# Patient Record
Sex: MALE | Race: BLACK OR AFRICAN AMERICAN | Employment: FULL TIME | URBAN - METROPOLITAN AREA
[De-identification: names, ages, dates, MRNs, and addresses within clinical notes are randomized per-mention and may not be internally consistent; named-entity substitution may affect disease eponyms.]

---

## 2019-04-24 ENCOUNTER — HOSPITAL ENCOUNTER (EMERGENCY)
Age: 29
Discharge: HOME | End: 2019-04-24
Attending: EMERGENCY MEDICINE

## 2019-04-24 VITALS
WEIGHT: 232 LBS | TEMPERATURE: 99.4 F | SYSTOLIC BLOOD PRESSURE: 150 MMHG | HEIGHT: 67 IN | OXYGEN SATURATION: 98 % | BODY MASS INDEX: 36.41 KG/M2 | RESPIRATION RATE: 18 BRPM | DIASTOLIC BLOOD PRESSURE: 99 MMHG | HEART RATE: 110 BPM

## 2019-04-24 DIAGNOSIS — J30.9 ALLERGIC RHINITIS, UNSPECIFIED SEASONALITY, UNSPECIFIED TRIGGER: Primary | ICD-10-CM

## 2019-04-24 PROCEDURE — 99282 EMERGENCY DEPT VISIT SF MDM: CPT

## 2019-04-24 RX ORDER — CETIRIZINE HYDROCHLORIDE 10 MG/1
10 TABLET ORAL DAILY
Qty: 30 TABLET | Refills: 0 | Status: SHIPPED | OUTPATIENT
Start: 2019-04-24 | End: 2019-05-24

## 2019-04-24 RX ORDER — FLUTICASONE PROPIONATE 50 MCG
1 SPRAY, SUSPENSION (ML) NASAL DAILY
Qty: 1 BOTTLE | Refills: 0 | Status: SHIPPED | OUTPATIENT
Start: 2019-04-24

## 2019-04-24 SDOH — HEALTH STABILITY: MENTAL HEALTH: HOW OFTEN DO YOU HAVE A DRINK CONTAINING ALCOHOL?: NEVER

## 2019-04-25 NOTE — ED PROVIDER NOTES
file     Attends [employer]oration[employer] service: Not on file     Active member of club or organization: Not on file     Attends meetings of clubs or organizations: Not on file     Relationship status: Not on file    Intimate partner violence:     Fear of current or ex partner: Not on file     Emotionally abused: Not on file     Physically abused: Not on file     Forced sexual activity: Not on file   Other Topics Concern    Not on file   Social History Narrative    Not on file       Nursing notes reviewed. ED Triage Vitals [04/24/19 2004]   Enc Vitals Group      BP (!) 150/99      Pulse 110      Resp 18      Temp 99.4 °F (37.4 °C)      Temp Source Oral      SpO2 98 %      Weight 232 lb (105.2 kg)      Height 5' 7\" (1.702 m)      Head Circumference       Peak Flow       Pain Score       Pain Loc       Pain Edu? Excl. in [address]? GENERAL:  Awake, alert. Well developed, well nourished with no apparent distress. HENT:  Normocephalic, Atraumatic, moist mucous membranes. Nasal mucosa injected bilaterally and slightly edematous with exudates. Posterior oropharynx slightly erythematous with no edema or exudate. EYES:  Pupils equal round and reactive to light, Conjunctiva normal, extraocular movements normal.  NECK:  No meningeal signs, Supple. CHEST:  Regular rate and rhythm, chest wall non-tender. LUNGS:  Clear to auscultation bilaterally, no respiratory distress. SKIN: Warm, dry and intact. NEUROLOGIC: Normal mental status. Moving all extremities to command. MEDICAL DECISION MAKING     No results found for this visit on 04/24/19. I estimate there is LOW risk for EPIGLOTTITIS, PNEUMONIA, MENINGITIS, OR URINARY TRACT INFECTION, thus I consider the discharge disposition reasonable. Also, there is no evidence or peritonitis, sepsis, or toxicity. Alexx Lin and I have discussed the diagnosis and risks, and we agree with discharging home to follow-up with their primary doctor.  We also discussed returning to the Emergency Department immediately if new or worsening symptoms occur. We have discussed the symptoms which are most concerning (e.g., changing or worsening pain, trouble swallowing or breating, neck stiffness, fever) that necessitate immediate return. Final Impression    1. Allergic rhinitis, unspecified seasonality, unspecified trigger        Discharge Vital Signs:  Blood pressure (!) 150/99, pulse 110, temperature 99.4 °F (37.4 °C), temperature source Oral, resp. rate 18, height 5' 7\" (1.702 m), weight 105.2 kg (232 lb), SpO2 98 %. Patient was given scripts for the following medications. I counseled patient how to take these medications. New Prescriptions    CETIRIZINE (ZYRTEC) 10 MG TABLET    Take 1 tablet by mouth daily    FLUTICASONE (FLONASE) 50 MCG/ACT NASAL SPRAY    1 spray by Each Nare route daily 1 Spray in each nostril       Disposition  Pt is in good condition upon Discharge to home. This chart was generated using the [employer] dictation system. I created this record but it may contain dictation errors.           Nivia Ruff MD  04/24/19 2015

## 2022-09-16 ENCOUNTER — HOSPITAL ENCOUNTER (EMERGENCY)
Age: 32
Discharge: HOME | End: 2022-09-16
Attending: EMERGENCY MEDICINE
Payer: COMMERCIAL

## 2022-09-16 VITALS
HEIGHT: 70 IN | BODY MASS INDEX: 30.23 KG/M2 | SYSTOLIC BLOOD PRESSURE: 145 MMHG | WEIGHT: 211.13 LBS | RESPIRATION RATE: 14 BRPM | DIASTOLIC BLOOD PRESSURE: 89 MMHG | HEART RATE: 92 BPM | OXYGEN SATURATION: 99 % | TEMPERATURE: 98.2 F

## 2022-09-16 DIAGNOSIS — K08.89 PAIN, DENTAL: ICD-10-CM

## 2022-09-16 DIAGNOSIS — K02.9 DENTAL CARIES: Primary | ICD-10-CM

## 2022-09-16 PROCEDURE — 99283 EMERGENCY DEPT VISIT LOW MDM: CPT

## 2022-09-16 RX ORDER — IBUPROFEN 600 MG/1
600 TABLET ORAL ONCE
Status: DISCONTINUED | OUTPATIENT
Start: 2022-09-16 | End: 2022-09-16

## 2022-09-16 RX ORDER — ACETAMINOPHEN 500 MG
1000 TABLET ORAL 4 TIMES DAILY PRN
Qty: 30 TABLET | Refills: 0 | Status: SHIPPED | OUTPATIENT
Start: 2022-09-16 | End: 2022-09-21

## 2022-09-16 RX ORDER — IBUPROFEN 600 MG/1
600 TABLET ORAL EVERY 8 HOURS PRN
Qty: 20 TABLET | Refills: 0 | Status: SHIPPED | OUTPATIENT
Start: 2022-09-16 | End: 2022-09-16

## 2022-09-16 RX ORDER — CLINDAMYCIN HYDROCHLORIDE 300 MG/1
300 CAPSULE ORAL 3 TIMES DAILY
Qty: 30 CAPSULE | Refills: 0 | Status: SHIPPED | OUTPATIENT
Start: 2022-09-16 | End: 2022-09-26

## 2022-09-16 ASSESSMENT — PAIN SCALES - GENERAL: PAINLEVEL_OUTOF10: 7

## 2022-09-16 ASSESSMENT — PAIN DESCRIPTION - DESCRIPTORS: DESCRIPTORS: THROBBING

## 2022-09-16 ASSESSMENT — PAIN DESCRIPTION - LOCATION: LOCATION: TEETH

## 2022-09-16 ASSESSMENT — PAIN DESCRIPTION - ORIENTATION: ORIENTATION: RIGHT

## 2022-09-16 ASSESSMENT — PAIN DESCRIPTION - PAIN TYPE: TYPE: ACUTE PAIN

## 2022-09-16 NOTE — DISCHARGE INSTRUCTIONS
Avoid hot and cold foods. Try to chew on the other side of your mouth. Take the clindamycin antibiotic 3 times daily until gone. Take Tylenol 2 tablets every 6 hours for pain. Follow-up with the  oral surgery clinic or one of the other dental clinics listed below. Call first thing Monday morning. DENTAL / [facility]  [phone]    --------------------------------------------------------------------------------------------------------------------      [facility] (3 locations) :  [address]  [address] [address]  [address], [address] [place] [address],[address]  [facility]   [phone]    Age 5 and older. Walk ins welcome. 9am-9pm 7 days/week. Self Pay, Private Insurance and [address] [facility] residents only)    [place] [address]    [facility].  [phone]   [address]  [phone]   [address]  [phone]  [place] [address].  [phone]   [facility] [address]  [phone]         [facility] 645  [address] [facility].  [phone]   [facility]  [address]  [phone]     [facility] MEDICAL [address] 2nd floor  [phone]   [facility]  [address] [phone] [address] [phone] [address] [facility]  [phone] [address]  [phone] [facility] AND [address] [facility]  [place].  Oral Surgery Dept: [phone]  Dental Clinic: [address]  Up to age 12  [place] [address] [phone]

## 2022-09-16 NOTE — ED PROVIDER NOTES
TRIAGE CHIEF COMPLAINT:   Chief Complaint   Patient presents with    Dental Pain     Pt c/o right sided dental, facial pain. Slight, right sided facial swelling noted. HPI: Anaya Dodson is a 34 y.o. male who presents to the Emergency Department with complaint of right-sided dental pain which has been on and off for the past month. Patient states he has numerous carious teeth but cannot get into see a dentist.  Denies fever or chills. He has no sore throat. No neck pain chest pain or shortness of breath. He states he feels like he may be getting some swelling of the right side of his face. Patient is allergic to penicillin. REVIEW OF SYSTEMS:  6 systems reviewed. Pertinent positives per HPI. Otherwise noted to be negative. Nursing notes reviewed and agree with above. Past medical/surgical history reviewed. MEDICATIONS   Patient's Medications   New Prescriptions    No medications on file   Previous Medications    FLUTICASONE (FLONASE) 50 MCG/ACT NASAL SPRAY    1 spray by Each Nare route daily 1 Spray in each nostril   Modified Medications    No medications on file   Discontinued Medications    No medications on file         ALLERGIES   Allergies   Allergen Reactions    Pcn [Penicillins]      As a child. BP (!) 145/89   Pulse 92   Temp 98.2 °F (36.8 °C) (Oral)   Resp 14   Ht 5' 10\" (1.778 m)   Wt 211 lb 2 oz (95.8 kg)   SpO2 99%   BMI 30.29 kg/m²   General:  No acute distress. Non toxic appearance  Head:   Normocephalic and atraumatic  Eyes:   Conjunctiva clear, MARGARITO, EOM's intact. ENT:   There is no facial swelling or trismus. No stridor. He swallows easily. The floor the mouth is normal.  Posterior pharynx is normal.  He has a carious right lower third molar decayed down to the gumline as well as a carious right lower first molar and left lower second molar. No drainable abscess seen. Neck:   Supple. No adenopathy.   Lungs/Chest:  No respiratory distress  CVS:   Regular rate and rhythm  Extremities:  Full range of motion  Skin:   No rashes or lesions to exposed skin  Neuro:  Alert and OX3. Speech clear and appropriate. No extremity weakness. Normal sensation in all extremities. No facial asymmetry. Gait normal.  Psych:   Affect normal. Mood normal        RADIOLOGY      LAB      ED COURSE / MDM:  76-year-old male with chronic dental caries presents with right lower dental pain that has been present on and off for a month. He has no trismus. No facial swelling. He is afebrile and appears nontoxic. The floor the mouth is normal.  No stridor. No clinical evidence for Meenu's angina, retropharyngeal or parapharyngeal abscess. He was started on clindamycin and given ibuprofen/Tylenol for pain. Recommended follow-up with oral surgery at [facility] or one of the local dental clinics. I discussed with Razia Hernandez the results of evaluation in the Emergency Department, diagnosis, care and prognosis. The plan is to discharge to home. The patient is in agreement with the plan and questions have been answered. I also discussed with the patient and/or family the reasons which may require a return visit and the importance of follow-up care.        (Please note that portions of this note may have been completed with a voice recognition program.  Efforts were made to edit the dictation but occasionally words are mis-transcribed)        FINAL IMPRESSION:  1 --dental caries  2 --dental pain     Berenice Tuttle MD  09/17/22 1721

## 2022-09-16 NOTE — LETTER
[facility] Emergency Department  [address] [address]  Phone: [phone]  Fax: [phone]               September 16, 2022    Patient: Mckenna Green   YOB: 1990   Date of Visit: 9/16/2022       To Whom It May Concern:    Mckenna Green was seen and treated in our emergency department on 9/16/2022.        Sincerely,               Signature:__________________________________

## 2022-09-20 ENCOUNTER — HOSPITAL ENCOUNTER (EMERGENCY)
Age: 32
Discharge: HOME | End: 2022-09-21
Attending: EMERGENCY MEDICINE
Payer: COMMERCIAL

## 2022-09-20 VITALS
WEIGHT: 208.19 LBS | HEART RATE: 110 BPM | TEMPERATURE: 101.8 F | DIASTOLIC BLOOD PRESSURE: 87 MMHG | HEIGHT: 70 IN | BODY MASS INDEX: 29.8 KG/M2 | OXYGEN SATURATION: 99 % | RESPIRATION RATE: 14 BRPM | SYSTOLIC BLOOD PRESSURE: 115 MMHG

## 2022-09-20 DIAGNOSIS — B34.9 VIRAL SYNDROME: Primary | ICD-10-CM

## 2022-09-20 LAB — S PYO AG THROAT QL: NEGATIVE

## 2022-09-20 PROCEDURE — 87880 STREP A ASSAY W/OPTIC: CPT

## 2022-09-20 PROCEDURE — 6370000000 HC RX 637 (ALT 250 FOR IP): Performed by: EMERGENCY MEDICINE

## 2022-09-20 PROCEDURE — U0005 INFEC AGEN DETEC AMPLI PROBE: HCPCS

## 2022-09-20 PROCEDURE — U0003 INFECTIOUS AGENT DETECTION BY NUCLEIC ACID (DNA OR RNA); SEVERE ACUTE RESPIRATORY SYNDROME CORONAVIRUS 2 (SARS-COV-2) (CORONAVIRUS DISEASE [COVID-19]), AMPLIFIED PROBE TECHNIQUE, MAKING USE OF HIGH THROUGHPUT TECHNOLOGIES AS DESCRIBED BY CMS-2020-01-R: HCPCS

## 2022-09-20 PROCEDURE — 87081 CULTURE SCREEN ONLY: CPT

## 2022-09-20 PROCEDURE — 99283 EMERGENCY DEPT VISIT LOW MDM: CPT

## 2022-09-20 RX ORDER — BENZONATATE 100 MG/1
100 CAPSULE ORAL 3 TIMES DAILY PRN
Qty: 30 CAPSULE | Refills: 0 | Status: SHIPPED | OUTPATIENT
Start: 2022-09-20 | End: 2022-09-27

## 2022-09-20 RX ORDER — CETIRIZINE HYDROCHLORIDE 10 MG/1
10 TABLET ORAL DAILY
Qty: 20 TABLET | Refills: 1 | Status: SHIPPED | OUTPATIENT
Start: 2022-09-20

## 2022-09-20 RX ORDER — AZITHROMYCIN 250 MG/1
TABLET, FILM COATED ORAL
Qty: 1 PACKET | Refills: 0 | Status: SHIPPED | OUTPATIENT
Start: 2022-09-20 | End: 2022-09-30

## 2022-09-20 RX ORDER — ACETAMINOPHEN 500 MG
1000 TABLET ORAL ONCE
Status: COMPLETED | OUTPATIENT
Start: 2022-09-20 | End: 2022-09-20

## 2022-09-20 RX ADMIN — ACETAMINOPHEN 1000 MG: 500 TABLET ORAL at 19:22

## 2022-09-20 ASSESSMENT — PAIN SCALES - GENERAL: PAINLEVEL_OUTOF10: 4

## 2022-09-20 ASSESSMENT — PAIN - FUNCTIONAL ASSESSMENT: PAIN_FUNCTIONAL_ASSESSMENT: NONE - DENIES PAIN

## 2022-09-20 NOTE — DISCHARGE INSTRUCTIONS
Discharge home  Quarantine yourself for 48 hours until your COVID-19 test results come back  Tessalon Perles  Zyrtec  Zithromax  Follow-up with [address] [place] [address] outpatient clinic

## 2022-09-20 NOTE — ED PROVIDER NOTES
[facility]  eMERGENCY dEPARTMENT eNCOUnter      Pt Name: Segundo Muro  MRN: [id number]  [place] 1990  Date of evaluation: 9/20/2022  Provider: Terrie Tomlinson MD  PCP: No primary care provider on file. CHIEF COMPLAINT       Chief Complaint   Patient presents with    URI     Pt c/o sore throat, cough, nasal congestion, and hot/cold chills since yesterday. HISTORY OFPRESENT ILLNESS   (Location/Symptom, Timing/Onset, Context/Setting, Quality, Duration, Modifying Factors,Severity)  Note limiting factors. Segundo Muro is a 34 y.o. male presents with complaints of a sore throat cough nasal congestion hot and cold chills since yesterday he has not been vaccinated against the coronavirus he states that he started a new job and he thinks that somebody there might of had it he denies any headache. Nursing Notes were all reviewed and agreed with or any disagreements were addressed  in the HPI. REVIEW OF SYSTEMS    (2-9 systems for level 4, 10 or more for level 5)     Review of Systems    Positives and Pertinent negatives as per HPI. Except as noted above in the ROS, all other systems were reviewed andnegative. PASTMEDICAL HISTORY     Past Medical History:   Diagnosis Date    Asthma     As child. SURGICAL HISTORY     History reviewed. No pertinent surgical history. CURRENT MEDICATIONS       Previous Medications    ACETAMINOPHEN (TYLENOL) 500 MG TABLET    Take 2 tablets by mouth 4 times daily as needed for Pain or Fever    CLINDAMYCIN (CLEOCIN) 300 MG CAPSULE    Take 1 capsule by mouth 3 times daily for 10 days May sub Generic and 150 mg capsules and 2x the amount    FLUTICASONE (FLONASE) 50 MCG/ACT NASAL SPRAY    1 spray by Each Nare route daily 1 Spray in each nostril       ALLERGIES     Aspirin, Ibuprofen, and Pcn [penicillins]    FAMILY HISTORY     History reviewed. No pertinent family history.        SOCIAL HISTORY       Social History     Socioeconomic History    Marital status: Single     Spouse name: None    Number of children: None    Years of education: None    Highest education level: None   Tobacco Use    Smoking status: Every Day     Packs/day: 0.50     Types: Cigarettes    Smokeless tobacco: Never   Substance and Sexual Activity    Alcohol use: Yes     Comment: occasionally    Drug use: Yes     Types: Marijuana (Weed)       SCREENINGS    [place] Coma Scale  Eye Opening: Spontaneous  Best Verbal Response: Oriented  Best Motor Response: Obeys commands  Jessica Coma Scale Score: 15 @FLOW([id number])@      PHYSICAL EXAM    (up to 7 for level 4, 8 or more for level 5)     ED Triage Vitals [09/20/22 1832]   BP Temp Temp Source Heart Rate Resp SpO2 Height Weight   115/87 (!) 101.8 °F (38.8 °C) Oral (!) 110 14 99 % 5' 10\" (1.778 m) 208 lb 3 oz (94.4 kg)       Physical Exam      General Appearance:  Alert, cooperative, no distress, appears stated age. Head:  Normocephalic, without obviousabnormality, atraumatic. Eyes:  conjunctiva/corneas clear, EOM's intact. Sclera anicteric. ENT: Mucous membranes moist.   Neck: Supple, symmetrical, trachea midline, no adenopathy. No jugular venous distention. Lungs:   Clear to auscultation bilaterally, respirationsunlabored. No rales, rhonchi or wheezes. Chest Wall:  No tenderness. Heart:  Regular rate and rhythm, S1 and S2 normal, no murmur, rub or gallop. Abdomen:   Soft, non-tender, bowel sounds active,   no masses, no organomegaly. Extremities: No edema, cords or calf tenderness. Full range of motion. Pulses: 2+ and symmetric   Skin: Turgor is normal, no rashes or lesions. Neurologic: Alert and oriented X 3. No focal findings.   Motor grossly normal.  Speech clear, no drift, CN III-XII grossly intact,        DIAGNOSTIC RESULTS   LABS:    Labs Reviewed   STREP SCREEN GROUP A THROAT   CULTURE, BETA STREP CONFIRM PLATES   [id number]       All other labs were within normal range or completed with a voice recognition program.  Efforts were made to edit the dictations but occasionally words are mis-transcribed.)    Briseida Clayton MD (electronically signed)       Briseida Clayton MD  09/20/22 6126

## 2022-09-21 LAB — SARS-COV-2, PCR: NOT DETECTED

## 2022-09-21 NOTE — ED NOTES
Patient given d/c instructions with return verbalization including Rx, emphasis on f/u, to return with worsening s/s. Pt given work note, educated on My chart for Covid results. Ambulated to lobby with steady gait.      Anna Sotomayor RN  09/21/22 1971

## 2022-09-23 LAB — S PYO THROAT QL CULT: NORMAL
